# Patient Record
Sex: FEMALE | Race: WHITE | NOT HISPANIC OR LATINO | Employment: FULL TIME
[De-identification: names, ages, dates, MRNs, and addresses within clinical notes are randomized per-mention and may not be internally consistent; named-entity substitution may affect disease eponyms.]

---

## 2017-05-26 ENCOUNTER — HEALTH MAINTENANCE LETTER (OUTPATIENT)
Age: 37
End: 2017-05-26

## 2021-01-09 ENCOUNTER — HEALTH MAINTENANCE LETTER (OUTPATIENT)
Age: 41
End: 2021-01-09

## 2021-01-13 ENCOUNTER — HOSPITAL ENCOUNTER (OUTPATIENT)
Dept: MAMMOGRAPHY | Facility: CLINIC | Age: 41
Discharge: HOME | End: 2021-01-13
Attending: OBSTETRICS & GYNECOLOGY | Admitting: OBSTETRICS & GYNECOLOGY
Payer: COMMERCIAL

## 2021-01-13 DIAGNOSIS — Z12.31 VISIT FOR SCREENING MAMMOGRAM: ICD-10-CM

## 2021-01-13 PROCEDURE — 77067 SCR MAMMO BI INCL CAD: CPT

## 2021-02-16 ENCOUNTER — OFFICE VISIT (OUTPATIENT)
Dept: VASCULAR SURGERY | Facility: CLINIC | Age: 41
End: 2021-02-16

## 2021-02-16 DIAGNOSIS — I78.1 ASYMPTOMATIC SPIDER VEINS OF BOTH LOWER EXTREMITIES: Primary | ICD-10-CM

## 2021-02-16 PROCEDURE — S9999 SALES TAX: HCPCS | Performed by: SURGERY

## 2021-02-16 PROCEDURE — 36468 NJX SCLRSNT SPIDER VEINS: CPT | Performed by: SURGERY

## 2021-02-16 NOTE — PROGRESS NOTES
[facility] Procedure Note    Lilian Moore  February 16, 2021    Lilian Moore is a 40 year old year old female. She presents for Sclerotherapy  .    There were no vitals taken for this visit.    Flowsheet Data 2/16/2021   Side: Bilateral       Sclerotherapy    Date/Time: 2/16/2021 12:02 PM  Performed by: Emmanuel Cruz MD  Authorized by: Emmanuel Cruz MD     Time out: Immediately prior to the procedure a time out was called    Type:  Cosmetic  Session:  Full  Procedure side:  Bilateral  Solution/Amount:  .5 POLIDOCANOL  Syringes::  5 syringes of foamed 0.5% polidocanol  Patient tolerance:  Patient tolerated the procedure well with no immediate complications  Wrap/Hose:  Hose    DESCRIPTION OF PROCEDURE:  Lilian previously had at least 1 round of cosmetic injection sclerotherapy performed by Dr. Aviles in this office in 2015.  I did not have access to those records today.  She is finished having kids and presents at this time wanting more sclerotherapy for new spider telangiectasias that have developed primarily in her thighs following her last pregnancy.    After informed consent she was placed on the table and her bilateral legs were prepped with an alcohol solution.  I utilized a Syris polarized headlight.  I used 5 syringes of foamed 0.5% polidocanol.  I used the syringes with 30-gauge needles to access prominent clusters of spider telangiectasias primarily on her bilateral lateral thighs and right pretibial region.  She also rolled into the prone position and I address some additional spider telangiectasias on the posterior right thigh.  I had excellent uptake of the solution with no significant extravasation.  She was returned to the supine position.  Her bilateral legs were prepped and dried and she was helped into her thigh-high compression stockings.  We reviewed post procedure instructions.  All of her questions were answered.  She will return in 6 weeks for a follow-up sclerotherapy  jovany.    Emmanuel Cruz MD

## 2021-02-16 NOTE — LETTER
2/16/2021         RE: Lilian Moore  [address]  [place] [address]        Dear Colleague,    Thank you for referring your patient, Lilian Moore, to the [facility] [place]. Please see a copy of my visit note below.          [facility] Procedure Note    Lilian Moore  February 16, 2021    Lilian Moore is a 40 year old year old female. She presents for Sclerotherapy  .    There were no vitals taken for this visit.    Flowsheet Data 2/16/2021   Side: Bilateral       Sclerotherapy    Date/Time: 2/16/2021 12:02 PM  Performed by: Emmanuel Cruz MD  Authorized by: Emmanuel Cruz MD     Time out: Immediately prior to the procedure a time out was called    Type:  Cosmetic  Session:  Full  Procedure side:  Bilateral  Solution/Amount:  .5 POLIDOCANOL  Syringes::  5 syringes of foamed 0.5% polidocanol  Patient tolerance:  Patient tolerated the procedure well with no immediate complications  Wrap/Hose:  Hose    DESCRIPTION OF PROCEDURE:  Lilian previously had at least 1 round of cosmetic injection sclerotherapy performed by Dr. Aviles in this office in 2015.  I did not have access to those records today.  She is finished having kids and presents at this time wanting more sclerotherapy for new spider telangiectasias that have developed primarily in her thighs following her last pregnancy.    After informed consent she was placed on the table and her bilateral legs were prepped with an alcohol solution.  I utilized a Syris polarized headlight.  I used 5 syringes of foamed 0.5% polidocanol.  I used the syringes with 30-gauge needles to access prominent clusters of spider telangiectasias primarily on her bilateral lateral thighs and right pretibial region.  She also rolled into the prone position and I address some additional spider telangiectasias on the posterior right thigh.  I had excellent uptake of the solution with no significant extravasation.  She was returned to the supine  position.  Her bilateral legs were prepped and dried and she was helped into her thigh-high compression stockings.  We reviewed post procedure instructions.  All of her questions were answered.  She will return in 6 weeks for a follow-up sclerotherapy recheck.    Emmanuel Cruz MD      Again, thank you for allowing me to participate in the care of your patient.        Sincerely,        Emmanuel Cruz MD

## 2021-04-13 ENCOUNTER — OFFICE VISIT (OUTPATIENT)
Dept: VASCULAR SURGERY | Facility: CLINIC | Age: 41
End: 2021-04-13

## 2021-04-13 DIAGNOSIS — I78.1 ASYMPTOMATIC SPIDER VEINS OF BOTH LOWER EXTREMITIES: Primary | ICD-10-CM

## 2021-04-13 PROCEDURE — 36468 NJX SCLRSNT SPIDER VEINS: CPT | Performed by: SURGERY

## 2021-04-13 PROCEDURE — S9999 SALES TAX: HCPCS | Performed by: SURGERY

## 2021-04-13 NOTE — PROGRESS NOTES
[facility] Procedure Note    Lilian Moore  April 13, 2021    Lilian Moore is a 40 year old year old female. She presents for Sclerotherapy  .    There were no vitals taken for this visit.    Flowsheet Data 2/16/2021   Side: Bilateral       Sclerotherapy    Date/Time: 4/13/2021 12:47 PM  Performed by: Emmanuel Cruz MD  Authorized by: Emmanuel Cruz MD     Time out: Immediately prior to the procedure a time out was called    Preparation: Patient was prepped and draped in usual sterile fashion    Type:  Cosmetic  Session:  Full  Procedure side:  Bilateral  Solution/Amount:  .5 POLIDOCANOL and 1% POLIDOCANOL  Syringes::  3 syringes of 0.5% polidocanol; 2 syringes of 1% polidocanol.      DESCRIPTION OF TECHNIQUE:  Lilian previously had 2 rounds of cosmetic injection sclerotherapy performed by Dr. Aviles in this office in 2015.  More recently I performed a full session of sclerotherapy using 5 syringes of foamed 0.5 polidocanol on 2/16/2021.  She presents today for what I believe will be her fourth session of cosmetic injection sclerotherapy.    After informed consent she was placed on the table in a prone position and her bilateral legs were prepped with an alcohol solution.  I utilized a Syris polarized headlight.  I used 2 syringes of foamed 1% polidocanol with a 27-gauge butterfly needle to access a few reticular veins of her bilateral posterior thighs.  She had excellent uptake of the solution with no significant extravasation.  I then used 3 syringes of foamed 0.5% polidocanol with 30-gauge needles to access prominent clusters of spider telangiectasias on her bilateral posterior thighs.  Again.  She had excellent uptake of the solution with no evidence of significant extravasation.  Her bilateral legs were cleansed and dried and she was helped into her thigh-high compression stockings.  We reviewed post procedure instructions.  All of her questions were answered.  She will return in 6 weeks  for a follow-up sclerotherapy recheck.

## 2021-04-13 NOTE — LETTER
4/13/2021         RE: Lilian Moore  [address] [place] [address]        Dear Colleague,    Thank you for referring your patient, Lilian Moore, to the [facility] [place]. Please see a copy of my visit note below.          [facility] Procedure Note    Lilian Moore  April 13, 2021    Lilian Moore is a 40 year old year old female. She presents for Sclerotherapy  .    There were no vitals taken for this visit.    Flowsheet Data 2/16/2021   Side: Bilateral       Sclerotherapy    Date/Time: 4/13/2021 12:47 PM  Performed by: Emmanuel Cruz MD  Authorized by: Emmanuel Cruz MD     Time out: Immediately prior to the procedure a time out was called    Preparation: Patient was prepped and draped in usual sterile fashion    Type:  Cosmetic  Session:  Full  Procedure side:  Bilateral  Solution/Amount:  .5 POLIDOCANOL and 1% POLIDOCANOL  Syringes::  3 syringes of 0.5% polidocanol; 2 syringes of 1% polidocanol.      DESCRIPTION OF TECHNIQUE:  Lilian previously had 2 rounds of cosmetic injection sclerotherapy performed by Dr. Aviles in this office in 2015.  More recently I performed a full session of sclerotherapy using 5 syringes of foamed 0.5 polidocanol on 2/16/2021.  She presents today for what I believe will be her fourth session of cosmetic injection sclerotherapy.    After informed consent she was placed on the table in a prone position and her bilateral legs were prepped with an alcohol solution.  I utilized a Syris polarized headlight.  I used 2 syringes of foamed 1% polidocanol with a 27-gauge butterfly needle to access a few reticular veins of her bilateral posterior thighs.  She had excellent uptake of the solution with no significant extravasation.  I then used 3 syringes of foamed 0.5% polidocanol with 30-gauge needles to access prominent clusters of spider telangiectasias on her bilateral posterior thighs.  Again.  She had excellent uptake of the solution with no  evidence of significant extravasation.  Her bilateral legs were cleansed and dried and she was helped into her thigh-high compression stockings.  We reviewed post procedure instructions.  All of her questions were answered.  She will return in 6 weeks for a follow-up sclerotherapy recheck.        Again, thank you for allowing me to participate in the care of your patient.        Sincerely,        Emmanuel Cruz MD

## 2021-05-25 ENCOUNTER — OFFICE VISIT (OUTPATIENT)
Dept: VASCULAR SURGERY | Facility: CLINIC | Age: 41
End: 2021-05-25
Payer: COMMERCIAL

## 2021-05-25 DIAGNOSIS — I78.1 ASYMPTOMATIC SPIDER VEINS OF BOTH LOWER EXTREMITIES: Primary | ICD-10-CM

## 2021-05-25 PROCEDURE — 99207 PR VEINSOLUTIONS POST OPERATIVE VISIT: CPT | Performed by: SURGERY

## 2021-05-25 NOTE — LETTER
5/25/2021         RE: Lilian Moore  [address] [place] [address]        Dear Colleague,    Thank you for referring your patient, Lilian Moore, to the [facility] [place]. Please see a copy of my visit note below.    Lilian returns today for a sclero recheck.  She is a healthy 41-year-old female who underwent 2 rounds of cosmetic injection sclerotherapy with Dr. Aviles in 2015.  I subsequently performed a full session of sclerotherapy using 5 syringes of foamed 0.5% polidocanol on 2/16/2021.  She had her fourth session of sclerotherapy with me on 4/13/2021.  She returns today for a simple recheck.  She is very pleased with her cosmetic results.  She still has a few spider veins on her posterior left thigh.  She enjoys being outdoors during the summer months and would prefer to hold off on additional sclerotherapy until later this fall.    On exam today she is a very tan healthy-appearing female in no acute distress.  There has been a significant decrease in the spider veins and reticular veins of her bilateral lower extremities.  She has just a few scattered remnant spider veins on her posterior left thigh.  No hyperpigmentation or significant trapped blood.    IMPRESSION:  6 weeks status post her fourth session of cosmetic lower extremity injection sclerotherapy clinically doing very well.  She is pleased with her cosmetic results.    RECOMMENDATION:  She has a few residual spider veins on her posterior left thigh.  She plans to return later this fall once the weather cools down for additional sclerotherapy.    Petros Cruz MD      Again, thank you for allowing me to participate in the care of your patient.        Sincerely,        Emmanuel Cruz MD

## 2021-05-25 NOTE — PROGRESS NOTES
Lilian returns today for a sclero recheck.  She is a healthy 41-year-old female who underwent 2 rounds of cosmetic injection sclerotherapy with Dr. Aviles in 2015.  I subsequently performed a full session of sclerotherapy using 5 syringes of foamed 0.5% polidocanol on 2/16/2021.  She had her fourth session of sclerotherapy with me on 4/13/2021.  She returns today for a simple recheck.  She is very pleased with her cosmetic results.  She still has a few spider veins on her posterior left thigh.  She enjoys being outdoors during the summer months and would prefer to hold off on additional sclerotherapy until later this fall.    On exam today she is a very tan healthy-appearing female in no acute distress.  There has been a significant decrease in the spider veins and reticular veins of her bilateral lower extremities.  She has just a few scattered remnant spider veins on her posterior left thigh.  No hyperpigmentation or significant trapped blood.    IMPRESSION:  6 weeks status post her fourth session of cosmetic lower extremity injection sclerotherapy clinically doing very well.  She is pleased with her cosmetic results.    RECOMMENDATION:  She has a few residual spider veins on her posterior left thigh.  She plans to return later this fall once the weather cools down for additional sclerotherapy.    Petros Cruz MD

## 2021-10-23 ENCOUNTER — HEALTH MAINTENANCE LETTER (OUTPATIENT)
Age: 41
End: 2021-10-23

## 2022-02-12 ENCOUNTER — HEALTH MAINTENANCE LETTER (OUTPATIENT)
Age: 42
End: 2022-02-12

## 2022-02-15 ENCOUNTER — HOSPITAL ENCOUNTER (OUTPATIENT)
Dept: MAMMOGRAPHY | Facility: CLINIC | Age: 42
Discharge: HOME | End: 2022-02-15
Attending: OBSTETRICS & GYNECOLOGY | Admitting: OBSTETRICS & GYNECOLOGY
Payer: COMMERCIAL

## 2022-02-15 DIAGNOSIS — Z12.31 VISIT FOR SCREENING MAMMOGRAM: ICD-10-CM

## 2022-02-15 PROCEDURE — 77067 SCR MAMMO BI INCL CAD: CPT

## 2022-10-09 ENCOUNTER — HEALTH MAINTENANCE LETTER (OUTPATIENT)
Age: 42
End: 2022-10-09

## 2023-02-21 ENCOUNTER — HOSPITAL ENCOUNTER (OUTPATIENT)
Dept: MAMMOGRAPHY | Facility: CLINIC | Age: 43
Discharge: HOME | End: 2023-02-21
Attending: OBSTETRICS & GYNECOLOGY | Admitting: OBSTETRICS & GYNECOLOGY
Payer: COMMERCIAL

## 2023-02-21 DIAGNOSIS — Z12.31 VISIT FOR SCREENING MAMMOGRAM: ICD-10-CM

## 2023-02-21 PROCEDURE — 77067 SCR MAMMO BI INCL CAD: CPT

## 2023-03-25 ENCOUNTER — HEALTH MAINTENANCE LETTER (OUTPATIENT)
Age: 43
End: 2023-03-25

## 2024-02-22 ENCOUNTER — HOSPITAL ENCOUNTER (OUTPATIENT)
Dept: MAMMOGRAPHY | Facility: CLINIC | Age: 44
Discharge: HOME | End: 2024-02-22
Attending: OBSTETRICS & GYNECOLOGY | Admitting: OBSTETRICS & GYNECOLOGY
Payer: COMMERCIAL

## 2024-02-22 DIAGNOSIS — Z12.31 VISIT FOR SCREENING MAMMOGRAM: ICD-10-CM

## 2024-02-22 PROCEDURE — 77063 BREAST TOMOSYNTHESIS BI: CPT

## 2024-05-25 ENCOUNTER — HEALTH MAINTENANCE LETTER (OUTPATIENT)
Age: 44
End: 2024-05-25

## 2025-03-24 ENCOUNTER — ANCILLARY ORDERS (OUTPATIENT)
Dept: MAMMOGRAPHY | Facility: CLINIC | Age: 45
End: 2025-03-24

## 2025-03-24 ENCOUNTER — HOSPITAL ENCOUNTER (OUTPATIENT)
Dept: MAMMOGRAPHY | Facility: CLINIC | Age: 45
Discharge: HOME | End: 2025-03-24
Attending: OBSTETRICS & GYNECOLOGY | Admitting: OBSTETRICS & GYNECOLOGY
Payer: COMMERCIAL

## 2025-03-24 DIAGNOSIS — Z12.31 VISIT FOR SCREENING MAMMOGRAM: ICD-10-CM

## 2025-03-24 PROCEDURE — 77063 BREAST TOMOSYNTHESIS BI: CPT

## 2025-06-14 ENCOUNTER — HEALTH MAINTENANCE LETTER (OUTPATIENT)
Age: 45
End: 2025-06-14